# Patient Record
Sex: MALE | Race: WHITE | NOT HISPANIC OR LATINO | Employment: PART TIME | URBAN - METROPOLITAN AREA
[De-identification: names, ages, dates, MRNs, and addresses within clinical notes are randomized per-mention and may not be internally consistent; named-entity substitution may affect disease eponyms.]

---

## 2018-04-20 ENCOUNTER — TRANSCRIBE ORDERS (OUTPATIENT)
Dept: SLEEP MEDICINE | Facility: HOSPITAL | Age: 53
End: 2018-04-20

## 2018-04-20 DIAGNOSIS — G47.33 OSA (OBSTRUCTIVE SLEEP APNEA): Primary | ICD-10-CM

## 2018-04-24 ENCOUNTER — HOSPITAL ENCOUNTER (OUTPATIENT)
Dept: SLEEP MEDICINE | Facility: HOSPITAL | Age: 53
Discharge: HOME | End: 2018-04-24
Admitting: INTERNAL MEDICINE

## 2018-04-24 DIAGNOSIS — G47.33 OSA (OBSTRUCTIVE SLEEP APNEA): ICD-10-CM

## 2018-04-24 PROCEDURE — 95811 POLYSOM 6/>YRS CPAP 4/> PARM: CPT

## 2020-11-04 ENCOUNTER — TRANSCRIBE ORDERS (OUTPATIENT)
Dept: ADMINISTRATIVE | Facility: HOSPITAL | Age: 55
End: 2020-11-04

## 2020-11-04 DIAGNOSIS — R06.09 DYSPNEA ON EXERTION: Primary | ICD-10-CM

## 2021-07-23 ENCOUNTER — OFFICE VISIT (OUTPATIENT)
Dept: SLEEP MEDICINE | Facility: HOSPITAL | Age: 56
End: 2021-07-23

## 2021-07-23 VITALS
HEIGHT: 72 IN | DIASTOLIC BLOOD PRESSURE: 70 MMHG | SYSTOLIC BLOOD PRESSURE: 116 MMHG | BODY MASS INDEX: 42.66 KG/M2 | HEART RATE: 86 BPM | WEIGHT: 315 LBS | OXYGEN SATURATION: 96 %

## 2021-07-23 DIAGNOSIS — E66.01 OBESITY, MORBID, BMI 40.0-49.9 (HCC): ICD-10-CM

## 2021-07-23 DIAGNOSIS — R06.00 DYSPNEA, UNSPECIFIED TYPE: ICD-10-CM

## 2021-07-23 DIAGNOSIS — G47.33 OBSTRUCTIVE SLEEP APNEA: Primary | ICD-10-CM

## 2021-07-23 DIAGNOSIS — Z72.0 TOBACCO ABUSE: ICD-10-CM

## 2021-07-23 PROCEDURE — G0463 HOSPITAL OUTPT CLINIC VISIT: HCPCS

## 2021-07-23 NOTE — PROGRESS NOTES
[facility]  [address] [facility]  3RD FLOOR  [facility] [address]  [phone]    PCP: Erin William MD    Reason for visit:  Sleep disorders: YVONNE    Ishaan is a 56 y.o.male who was seen in the [facility] today. Last seen 6/5/21 at [facility]. He is on BIPAP at 21/17. He was unable to get AirTouch. He continues to have air leaks. He sleeps from 10:30pm to 5:30am. He wakes up feeling tired and unrefreshed. Some days worse than others.  He smoked approx 20 yrs and currently at 2ppd. Having soa. (resumed smoking Sept 2020)  [place] Sleepiness Scale is 13. Caffeine 1 per day. Alcohol 0 per week.    Ishaan  has no history on file for tobacco use.    Pertinent Positive Review of Systems of nasal congestion, soa, cough, ear pain  Rest of Review of Systems was negative as recorded in Sleep Questionnaire.    Patient  has no past medical history on file.   Low testosterone in male 01/20/2021   Cirrhosis 01/11/2021   Overview:     Formatting of this note might be different from the original.  Added automatically from request for surgery [id number]     Acute pain of both shoulders 08/06/2020   Osteoarthritis of both knees 07/28/2020   S/P medial meniscectomy of left knee 09/25/2019   Chronic bucket handle tear of medial meniscus of knee 08/29/2019   Overview:     Formatting of this note might be different from the original.  Added automatically from request for surgery [id number]     Old peripheral tear of medial meniscus of knee 08/08/2019   Nephrolithiasis 05/17/2019   Overview:     Formatting of this note might be different from the original.  resolved     Hepatic steatosis 05/13/2019   Liver cirrhosis 02/14/2019   Hepatosplenomegaly 01/09/2019   Overview:     Formatting of this note might be different from the original.  With borderline cirrhosis     Colon cancer screening 09/19/2018   Overview:     Formatting of this note might be different from the original.  Added automatically from request for  surgery [id number]     Sleep apnea 06/07/2018   Overview:     Formatting of this note might be different from the original.  bipap nightly      Acne vulgaris 01/25/2018   Dyslipidemia, goal LDL below 100 11/30/2017   Overview:     Formatting of this note might be different from the original.  ASCVD 10 year 8%     Uncontrolled type 2 diabetes mellitus with complication 09/11/2017   Morbid obesity due to excess calories 09/11/2017         Current Medications:  No current outpatient medications on file.  DM-APAP-CPM (CORICIDIN HBP) -2 MG TABS    Indications: Sore throat Take 1 tablet by mouth every 6 (six) hours as needed (cough, cold symptoms). 40 tablet   1 12/04/2019   Active   venlafaxine (EFFEXOR-XR) 150 MG 24 hr capsule    Indications: Current moderate episode of major depressive disorder without prior episode (CMS/HCC) Take 1 capsule by mouth daily. 90 capsule   3 08/06/2020   Active   mupirocin (BACTROBAN) 2 % ointment   Apply 1 Application topically 3 (three) times daily to affected area(s).. 1 g   0 09/07/2020   Active   hydrOXYzine (VISTARIL) 50 MG capsule    Indications: Insomnia, unspecified type Take 1-2 capsules by mouth nightly as needed (insomnia). 60 capsule   2 09/28/2020   Active   albuterol  (90 Base) MCG/ACT inhaler    Indications: Acute bronchitis, unspecified organism Inhale 2 puffs into the lungs every 4 (four) hours as needed for Wheezing or Shortness of Air. 1 Inhaler   0 01/07/2021   Active   meloxicam (MOBIC) 15 MG tablet    Indications: Diabetic polyneuropathy associated with type 2 diabetes mellitus (CMS/HCC) Take 1 tablet by mouth daily. 30 tablet   1 02/11/2021 02/11/2022 Active   traMADol (ULTRAM) 50 MG tablet   Take 1 tablet by mouth every 8 (eight) hours as needed for Pain. Max Daily Amount: 150 mg 30 tablet   0 03/04/2021   Active   guaiFENesin (MUCINEX PO)   Take by mouth.   0     Active   Acetaminophen (TYLENOL PO)   Take by mouth.   0     Active    neomycin-polymyxin-hydrocortisone (CORTISPORIN) otic solution    Indications: Acute otitis externa of left ear, unspecified type Place 3 drops in ear(s) 3 (three) times daily for 10 days. 10 mL   0 03/27/2021   Active   diclofenac (VOLTAREN) 1 % gel    Indications: Pain of left hand APPLY 2 GRAMS TOPICALLY TO AFFECTED JOINTS FOUR TIMES A DAY AS NEEDED FOR PAIN 350 g   2 03/29/2021   Active   Continuous Blood Gluc Sensor (FREESTYLE TG 14 DAY SENSOR) [facility]    Indications: Uncontrolled type 2 diabetes mellitus with complication (CMS/[facility]), Morbid obesity due to excess calories (CMS/[facility]) APPLY AND CHANGE DEVICE EVERY 14 DAYS 2 each   10 04/14/2021   Active   sildenafil (VIAGRA) 50 MG tablet    Indications: Erectile dysfunction, unspecified erectile dysfunction type TAKE 1 TABLET BY MOUTH AS DIRECTED TAKE ONE TABLET BY MOUTH 30 TO 60 MINUTES PRIOR TO SEXUAL ACTIVITY 30 tablet   2 04/25/2021   Active   loratadine (CLARITIN) 10 MG tablet    Indications: Seasonal allergic rhinitis due to pollen Take 1 tablet by mouth daily as needed for Allergies. 90 tablet   3 05/04/2021 05/04/2022 Active   B-D UF III MINI PEN NEEDLES 31G X 5 MM MISC   INJECT ONE INTO THE SKIN DAILY 100 each   5 06/16/2021   Active   benzonatate (TESSALON) 100 MG capsule   Take 1 capsule by mouth 3 (three) times daily as needed for Cough. 15 capsule   0 06/04/2021   Active   predniSONE (DELTASONE) 50 MG tablet   Take 1 tablet by mouth daily. 5 tablet   0 06/04/2021   Active   albuterol  (90 Base) MCG/ACT inhaler   Inhale 2 puffs into the lungs every 6 (six) hours as needed for Wheezing. 1 Inhaler   0 06/04/2021   Active   ondansetron (ZOFRAN-ODT) 4 MG disintegrating tablet   Take 1 tablet by mouth every 8 (eight) hours as needed for Nausea. 15 tablet   0 06/04/2021   Active   dulaglutide (TRULICITY) 0.75 MG/0.5ML pen    Indications: Well controlled type 2 diabetes mellitus (CMS/[facility]) Inject 0.75 mg into the skin once a week. 4 pen   3 06/22/2021    Active   budesonide-formoterol (SYMBICORT) 80-4.5 MCG/ACT inhaler    Indications: Chronic cough Inhale 2 puffs into the lungs 2 (two) times daily. 3 Inhaler   3 06/22/2021 06/22/2022 Active   cholecalciferol 25 MCG (1000 UT) tablet    Indications: Vitamin D deficiency Take 1 tablet by mouth daily. 90 tablet   3 06/22/2021 06/22/2022 Active   gabapentin (NEURONTIN) 800 MG tablet    Indications: Numbness and tingling of left leg, Diabetic polyneuropathy associated with type 2 diabetes mellitus (CMS/HCC) TAKE ONE TABLET BY MOUTH THREE TIMES A  tablet   0 07/12/2021   Active   atorvastatin (LIPITOR) 20 MG tablet   TAKE ONE TABLET BY MOUTH ONCE NIGHTLY 90 tablet   0 07/12/2021   Active   testosterone cypionate (DEPOTESTOTERONE CYPIONATE) 200 MG/ML injection       0 04/20/2021   Active   empagliflozin (JARDIANCE) 25 MG tablet    Indications: Well controlled type 2 diabetes mellitus (CMS/HCC) Take 1 tablet by mouth daily. 90 tablet   3 07/14/2021   Active   lisinopril (PRINIVIL) 2.5 MG tablet    Indications: Microalbuminuria due to type 2 diabetes mellitus (CMS/HCC) Take 1 tablet by mouth daily. 90 tablet   3 07/14/2021   Active   metFORMIN (GLUCOPHAGE) 1000 MG tablet    Indications: Well controlled type 2 diabetes mellitus (CMS/HCC) Take 1 tablet by mouth 2 (two) times daily with meals. 180 tablet   3 07/14/2021   Active   Continuous Blood Gluc  (DEXCOM G6 ) DEVICE    Indications: Well controlled type 2 diabetes mellitus (CMS/HCC) Use  device as instructed/educated.. 1 each   0 07/14/2021   Active   Continuous Blood Gluc Sensor (DEXCOM G6 SENSOR) MISC    Indications: Well controlled type 2 diabetes mellitus (CMS/HCC) Apply 1 each topically every 10 (ten) days. 1 Box   3 07/14/2021   Active   Continuous Blood Gluc Transmit (DEXCOM G6 TRANSMITTER) MISC    Indications: Well controlled type 2 diabetes mellitus (CMS/HCC) Use transmitter as instructed/educated.. 1 each   0 07/14/2021   Active       "also entered in Sleep Questionnaire         Vital Signs: /70   Pulse 86   Ht 182.9 cm (72\")   Wt (!) 151 kg (333 lb)   SpO2 96%   BMI 45.16 kg/m²     Body mass index is 45.16 kg/m².       Tongue: Normal       Dentition: good       Pharynx: Posterior pharyngeal pillars are wide   Mallampatti: II (hard and soft palate, upper portion of tonsils anduvula visible)        General: Alert. Cooperative. Well developed. No acute distress.             Head:  Normocephalic. Symmetrical. Atraumatic.              Nose: No septal deviation. No drainage.          Throat: No oral lesions. No thrush. Moist mucous membranes.    Chest Wall:  Normal shape. Symmetric expansion with respiration. No tenderness.             Neck:  Trachea midline.           Lungs:  Clear to auscultation bilaterally. No wheezes. No rhonchi. No rales. Respirations regular, even and unlabored.            Heart:  Regular rhythm and normal rate. Normal S1 and S2. No murmur.     Abdomen:  Soft, non-tender and non-distended. Normal bowel sounds. No masses.  Extremities:  Moves all extremities well. No edema.    Psychiatric: Normal mood and affect.    Diagnostic data available to date is as below and was reviewed on current visit:  ·   · 4/24/18  Titration study from 9:42 PM to 5:34 AM.  Sleep efficiency 87.8%.  There was minimal slow-wave sleep.  Adequate supine sleep and REM sleep was seen.  Initial titration from 12 over rate up to 19/15.  At final pressures AHI index 0.9 with no significant desaturation.  Large leaks noted due to facial hair.  Patient did achieve supine and REM sleep as stated.    Most current available usage data reviewed on 07/23/2021:  · 100% compliance average 7 hours AHI 5.8 bilevel pressure 21/17 however leak for 3 hours.    DME Company: [facility]    No orders of the defined types were placed in this encounter.         Prescription to DME for replacement supplies as below:    Item HCPCS Description Qty Item HCPCS Description " Qty     Mask Cushion 2-Mo x  Chin Strap 1-Q 6 Mo     Nasal Pillows 2-Mo   Tubing 1-Q 3 Mo     Mask Nasal 1-Q3 Mo x  Disposable Filter 2-Mo   x  Headgear 1-Q6 Mo x  Non-Disp Filter 1-Q6 Mo     Rep Cushion Full Face 1-Mo x  Heat Humidifier 1     Full Face Mask 1-Q3 Mo x  Water Chamber 1-Q6 Mo     Oral/Nasal Cushion 1-Q3 Mo   Replmt Oral Cushion 2-Mo     Replmt Nasal Pillows 2-Mo x  Heated Tubing 1-Q6 Mo       Impression:  1. Obstructive sleep apnea    2. Obesity, morbid, BMI 40.0-49.9 (CMS/HCC)    3. Tobacco abuse    4. Dyspnea, unspecified type        Plan:  Ishaan needs AirTouch mask due to large air leak. Will try with [facility], otherwise different company for AirTouch mask.  Otherwise compliant and benefits.    Needs EVIE and PFT's and LDSCT.  He is a current cigarette smoker and reports shortness of breath.  Arrange at [facility].    I reiterated the importance of effective treatment of obstructive sleep apnea with PAP machine.  Cardiovascular health risks of untreated sleep apnea were again reviewed.  Patient was asked to remain cautious if there is persistent hypersomnolence. The benefit of weight loss in reducing severity of obstructive sleep apnea was discussed.  Patient would benefit from adhering to a strict diet to achieve ideal BMI.     Change of PAP supplies regularly is important for effective use.  Change of cushion on the mask or plugs on nasal pillows along with disposable filters once every month and change of mask frame, tubing, headgear and Velcro straps every 6 months at the minimum was reiterated.    This patient is compliant with PAP machine and benefits from its use.  Apnea hypopneas index is corrected/improved.  Daytime hypersomnolence has resolved.     Patient will follow up in this clinic in  [facility]    Thank you for allowing me to participate in your patient's care.    Electronically signed by Steve Vivas MD, 07/23/21,  8:53 AM EDT.    Part of this note may be an electronic transcription/translation of spoken language to printed text using the Dragon Dictation System.

## 2021-07-27 ENCOUNTER — TRANSCRIBE ORDERS (OUTPATIENT)
Dept: ADMINISTRATIVE | Facility: HOSPITAL | Age: 56
End: 2021-07-27

## 2021-07-27 DIAGNOSIS — Z12.2 ENCOUNTER FOR SCREENING FOR LUNG CANCER: Primary | ICD-10-CM

## 2021-08-20 ENCOUNTER — HOSPITAL ENCOUNTER (OUTPATIENT)
Dept: CT IMAGING | Facility: HOSPITAL | Age: 56
Discharge: HOME | End: 2021-08-20
Admitting: NURSE PRACTITIONER

## 2021-08-20 DIAGNOSIS — Z12.2 ENCOUNTER FOR SCREENING FOR LUNG CANCER: ICD-10-CM

## 2021-08-20 PROCEDURE — 71271 CT THORAX LUNG CANCER SCR C-: CPT
